# Patient Record
Sex: FEMALE | Race: WHITE | NOT HISPANIC OR LATINO | URBAN - METROPOLITAN AREA
[De-identification: names, ages, dates, MRNs, and addresses within clinical notes are randomized per-mention and may not be internally consistent; named-entity substitution may affect disease eponyms.]

---

## 2017-06-19 ENCOUNTER — ALLSCRIPTS OFFICE VISIT (OUTPATIENT)
Dept: OTHER | Facility: OTHER | Age: 22
End: 2017-06-19

## 2017-06-19 DIAGNOSIS — E78.5 HYPERLIPIDEMIA: ICD-10-CM

## 2017-11-13 ENCOUNTER — LAB REQUISITION (OUTPATIENT)
Dept: LAB | Facility: HOSPITAL | Age: 22
End: 2017-11-13
Payer: COMMERCIAL

## 2017-11-13 ENCOUNTER — ALLSCRIPTS OFFICE VISIT (OUTPATIENT)
Dept: OTHER | Facility: OTHER | Age: 22
End: 2017-11-13

## 2017-11-13 DIAGNOSIS — Z11.3 ENCOUNTER FOR SCREENING FOR INFECTIONS WITH PREDOMINANTLY SEXUAL MODE OF TRANSMISSION: ICD-10-CM

## 2017-11-13 DIAGNOSIS — Z01.419 ENCOUNTER FOR GYNECOLOGICAL EXAMINATION WITHOUT ABNORMAL FINDING: ICD-10-CM

## 2017-11-13 PROCEDURE — 87591 N.GONORRHOEAE DNA AMP PROB: CPT | Performed by: PHYSICIAN ASSISTANT

## 2017-11-13 PROCEDURE — G0145 SCR C/V CYTO,THINLAYER,RESCR: HCPCS | Performed by: PHYSICIAN ASSISTANT

## 2017-11-13 PROCEDURE — 87491 CHLMYD TRACH DNA AMP PROBE: CPT | Performed by: PHYSICIAN ASSISTANT

## 2017-11-14 NOTE — PROGRESS NOTES
Assessment    1  Encounter for gynecological examination without abnormal finding (V72 31) (Z01 [id number])    Plan  Contraceptives    · Sprintec 28 0 25-35 MG-MCG Oral Tablet; TAKE ONE TABLET BY MOUTH ONCEDAILY   Rx By: Adelaida Zee; Dispense: 90 Days ; #:90 X 28 Tablet Disp Pack; Refill: 3;Contraceptives; ESTEPHANIA = Y; Verified Transmission to [address]; Last Updated By: System, [place]; 11/13/2017 9:46:49 AM  Encounter for gynecological examination without abnormal finding, Screening for STD(sexually transmitted disease)    · (1) THIN PREP PAP WITH IMAGING; Status: In Progress - Specimen/Data Collected;  Done: [id number]   Perform:[facility] In Office Collection; HNQ:[id number]; Ordered;for gynecological examination without abnormal finding, Screening for STD (sexually transmitted disease); Ordered By:Marry Wyatt;  Maturation index required? : No  : 11/10/17  HPV? : if ASCUS  Screening for STD (sexually transmitted disease)    · (1) CHLAMYDIA/GC AMPLIFIED DNA, PCR; Source:Cervix; Status: In Progress -Specimen/Data Collected;   Done: [id number]   Perform:[facility] In Office Collection; NFP:[id number]; Ordered;for STD (sexually transmitted disease); Ordered By:Marry Wyatt;    Discussion/Summary  healthy adult female Pap test was done today cervical cancer screening is needed every three years Testing was done today for chlamydia, gonorrhea and HIV  Annual exam and pap performed, will call if abnormalinterested in switching from OCP to [facility], brochures and insurance form givenform to office when decidesrx sent via EMR1 year or sooner for IUD insertion  Chief Complaint  Pt here for yearly doing well today  History of Present Illness  HPI: 19 y/o [de-identified] female here for a new patient annual GYN exam  No significant medical or surgical history  Family hx:PGGM had breast cancer dx age [de-identified]   Menses regular on OCP, every 25 days x 4 days, periods regular off OCP but painful  (+) sexually active with 1 partner, (+) condoms  Pt denies any h/o STDs or dysplasia  Last pap per pt 2016 WNL  complaints    GYN HM, Adult Female [facility]: The patient is being seen for a Annual Gyn Exam evaluation  The last health maintenance visit was 1 year(s) ago  General Health: The patient's health since the last visit is described as good  Lifestyle:  She does not have any weight concerns  -- She exercises regularly  She exercises 3 or more times per week  Exercise includes running/jogging -- She does not use tobacco  The patient has never smoked cigarettes  -- She consumes alcohol  She reports occasional alcohol use  -- She denies drug use  She has never used illicit drugs  Reproductive health: the patient is premenopausal--   she reports no menstrual problems  Menstrual history:  age at menarche was 14  LMP: the last menstrual period was 11/10/17  Recent menstrual periods: bleeding has been normal  The cycles have been regular  The duration of her recent periods has been regular  -- she uses contraception  For contraception, she uses oral contraception pills  -- she is sexually active  She is monogamous with a male partner  -- she denies prior pregnancies G 0  Screening: Cervical cancer screening includes a pap smear performed 1/10/16 Neg,-- no previous human papilloma virus screening-- and-- no previous colposcopy  Breast cancer screening includes no previous mammogram  Colorectal cancer screening includes no previous colonoscopy  Review of Systems   Constitutional: no fever-- and-- no chills  Cardiovascular: no chest pain  Respiratory: no shortness of breath  Breasts: no breast pain-- and-- no breast lump  Gastrointestinal: no abdominal pain  Genitourinary: no dysuria,-- no pelvic pain,-- no vaginal discharge,-- no dysmenorrhea-- and-- no unexplained vaginal bleeding  Neurological: no headache  Active Problems  1  Chronic scapular pain (733 90,338 29) (M89 8X1,G89 29)   2  Contraceptives (V25 02)   3  Polycystic ovarian syndrome (256 4) (E28 2)   4  Right-sided low back pain without sciatica (724 2) (M54 5)   5  Seasonal allergic rhinitis due to pollen (477 0) (J30 1)    Past Medical History   · History of acute pharyngitis (V12 69) (Z87 09)   · History of acute sinusitis (V12 69) (Z87 09)   · History of headache (V13 89) (J14 323)   · History of indigestion (V12 79) (Z87 19)    The active problems and past medical history were reviewed and updated today  Surgical History   · Contraceptives (V25 02)    The surgical history was reviewed and updated today  Family History  Mother    · Family history of polycystic ovarian syndrome (V18 7) (Z84 2)   · Family history of Polycystic Ovarian Syndrome   · Family history of Thyroid Cancer  Maternal Grandmother    · Family history of polycystic ovarian syndrome (V18 7) (Z84 2)  Maternal Grandfather    · Family history of cardiac disorder (V17 49) (Z82 49)   · Family history of hypertension (V17 49) (Z82 49)  Paternal Grandfather    · Family history of diabetes mellitus (V18 0) (Z83 3)  Family History    · Family history of Thyroid Cancer    The family history was reviewed and updated today  Social History   · Never A Smoker   · No illicit drug use   · Occasional alcohol use   · Sexually active  The social history was reviewed and updated today  Current Meds   1  Sprintec 28 0 25-35 MG-MCG Oral Tablet; Take 1 tablet daily as directed Recorded    Allergies  1  No Known Drug Allergies    Vitals   Recorded: [id number] 35:15GQ   Systolic 423, RUE, Sitting   Diastolic 82, RUE, Sitting   Height 5 ft 4 in   Weight 144 lb    BMI Calculated 24 72   BSA Calculated 1 7   LMP [id number]       Physical Exam   Constitutional  General appearance: No acute distress, well appearing and well nourished  Neck  Thyroid: Normal, no thyromegaly  Pulmonary  Respiratory effort: No increased work of breathing or signs of respiratory distress  Auscultation of lungs: Clear to auscultation  Cardiovascular  Auscultation of heart: Normal rate and rhythm, normal S1 and S2, no murmurs  Genitourinary  External genitalia: Normal and no lesions appreciated  Vagina: Normal, no lesions or dryness appreciated  Urethra: Normal    Urethral meatus: Normal    Bladder: Normal, soft, non-tender and no prolapse or masses appreciated  Cervix: Normal, no palpable masses  A Pap smear was performed  Uterus: Normal, non-tender, not enlarged, and no palpable masses  Adnexa/parametria: Normal, non-tender and no fullness or masses appreciated  Chest  Breasts: Normal and no dimpling or skin changes noted  Abdomen  Abdomen: Normal, non-tender, and no organomegaly noted  Psychiatric  Orientation to person, place, and time: Normal    Mood and affect: Normal        Future Appointments    Date/Time Provider Specialty Site   06/21/2018 10:15 AM RAVEN Chisholm   Internal Medicine [facility] INTERNAL MED       Signatures   Electronically signed by : Vikas Ron, [facility]; Nov 13 2017  9:41AM EST                       (Author)    Electronically signed by : RAVEN Esposito ; Nov 13 2017  8:37PM EST                       (Author)

## 2017-11-15 LAB
CHLAMYDIA DNA CVX QL NAA+PROBE: NORMAL
N GONORRHOEA DNA GENITAL QL NAA+PROBE: NORMAL

## 2017-11-21 LAB
LAB AP GYN PRIMARY INTERPRETATION: NORMAL
LAB AP LMP: NORMAL
Lab: NORMAL

## 2018-01-14 VITALS
WEIGHT: 144 LBS | BODY MASS INDEX: 24.59 KG/M2 | DIASTOLIC BLOOD PRESSURE: 72 MMHG | RESPIRATION RATE: 18 BRPM | HEIGHT: 64 IN | HEART RATE: 86 BPM | SYSTOLIC BLOOD PRESSURE: 114 MMHG | TEMPERATURE: 98 F | OXYGEN SATURATION: 99 %

## 2018-01-14 VITALS
SYSTOLIC BLOOD PRESSURE: 128 MMHG | BODY MASS INDEX: 24.59 KG/M2 | HEIGHT: 64 IN | WEIGHT: 144 LBS | DIASTOLIC BLOOD PRESSURE: 82 MMHG

## 2018-06-21 ENCOUNTER — OFFICE VISIT (OUTPATIENT)
Dept: INTERNAL MEDICINE CLINIC | Facility: CLINIC | Age: 23
End: 2018-06-21
Payer: COMMERCIAL

## 2018-06-21 VITALS
OXYGEN SATURATION: 99 % | HEIGHT: 64 IN | HEART RATE: 84 BPM | TEMPERATURE: 99.1 F | RESPIRATION RATE: 14 BRPM | DIASTOLIC BLOOD PRESSURE: 70 MMHG | SYSTOLIC BLOOD PRESSURE: 118 MMHG | BODY MASS INDEX: 25.92 KG/M2 | WEIGHT: 151.8 LBS

## 2018-06-21 DIAGNOSIS — T78.40XA ALLERGIC STATE, INITIAL ENCOUNTER: ICD-10-CM

## 2018-06-21 DIAGNOSIS — E28.2 POLYCYSTIC OVARIAN SYNDROME: ICD-10-CM

## 2018-06-21 DIAGNOSIS — Z00.00 ROUTINE GENERAL MEDICAL EXAMINATION AT A HEALTH CARE FACILITY: Primary | ICD-10-CM

## 2018-06-21 PROCEDURE — 99395 PREV VISIT EST AGE 18-39: CPT | Performed by: INTERNAL MEDICINE

## 2018-06-21 RX ORDER — NORGESTIMATE AND ETHINYL ESTRADIOL 0.25-0.035
1 KIT ORAL DAILY
COMMUNITY

## 2018-06-21 NOTE — ASSESSMENT & PLAN NOTE
This 65-year-old female patient appears to be in good general health  Her examination today is normal and stable  She continues on birth control pills for treatment of polycystic ovarian syndrome  She has a request for a comprehensive metabolic profile as well as lipid panel  Will review the results of the studies once they are completed  Recommend follow-up evaluation in 1 year

## 2018-06-21 NOTE — ASSESSMENT & PLAN NOTE
Seasonal allergy symptoms recommend the use of over-the-counter Claritin Allegra or Zyrtec as per the product instructions

## 2018-06-21 NOTE — PROGRESS NOTES
Assessment/Plan:    Routine general medical examination at a health care facility  This 23-year-old female patient appears to be in good general health  Her examination today is normal and stable  She continues on birth control pills for treatment of polycystic ovarian syndrome  She has a request for a comprehensive metabolic profile as well as lipid panel  Will review the results of the studies once they are completed  Recommend follow-up evaluation in 1 year  Allergy  Seasonal allergy symptoms recommend the use of over-the-counter Claritin Allegra or Zyrtec as per the product instructions  Polycystic ovarian syndrome  Polycystic ovarian syndrome continue on birth control pills       Diagnoses and all orders for this visit:    Routine general medical examination at a health care facility  -     Comprehensive metabolic panel; Future  -     Lipid panel; Future    Allergic state, initial encounter    Polycystic ovarian syndrome    Other orders  -     norgestimate-ethinyl estradiol (SPRINTEC 28) 0 25-35 MG-MCG per tablet; Take 1 tablet by mouth daily        Subjective:      Patient ID: Amrit Ibrahim is a 24 y o  female  This is an annual health maintenance examination for this 23-year-old female patient  She has finished college in will be moving to [place] in the near future to start a new job  She has no physical complaints at this time other than seasonal allergy symptoms with nasal congestion and a recent fall with a persistent tenderness over the coccyx region  The symptoms of coccyx tenderness shot are improving    She does have a history of polycystic ovarian condition and is on birth control pill  The following portions of the patient's history were reviewed and updated as appropriate: She  has no past medical history on file  She  has no past surgical history on file    Her family history includes Heart disease in her maternal grandfather and paternal grandfather; Hypertension in her maternal grandfather; Polycystic ovary syndrome in her maternal grandmother and mother; Thyroid cancer in her family and mother  She  reports that she has never smoked  She does not have any smokeless tobacco history on file  She reports that she drinks alcohol  She reports that she does not use drugs       Review of Systems   HENT: Positive for congestion and rhinorrhea  All other systems reviewed and are negative  Objective:      /70   Pulse 84   Temp 99 1 °F (37 3 °C)   Resp 14   Ht 5' 4" (1 626 m)   Wt 68 9 kg (151 lb 12 8 oz)   SpO2 99%   BMI 26 06 kg/m²          Physical Exam   Constitutional: She is oriented to person, place, and time  Vital signs are normal  She appears well-developed and well-nourished  She is cooperative  No distress  HENT:   Head: Normocephalic and atraumatic  Right Ear: Hearing, tympanic membrane, external ear and ear canal normal    Left Ear: Hearing, tympanic membrane, external ear and ear canal normal    Nose: Nose normal  No mucosal edema  Mouth/Throat: Uvula is midline, oropharynx is clear and moist and mucous membranes are normal    Eyes: Conjunctivae and lids are normal  Pupils are equal, round, and reactive to light  Right eye exhibits no discharge  Left eye exhibits no discharge  Neck: No JVD present  Carotid bruit is not present  No thyromegaly present  Cardiovascular: Normal rate, regular rhythm, normal heart sounds and intact distal pulses  No murmur heard  Pulmonary/Chest: Effort normal and breath sounds normal  No respiratory distress  She has no wheezes  She has no rales  She exhibits no tenderness  Abdominal: Soft  Normal appearance and bowel sounds are normal  She exhibits no distension and no mass  There is no tenderness  There is no rebound and no guarding  Musculoskeletal: Normal range of motion  She exhibits no edema, tenderness or deformity  Lymphadenopathy:     She has no cervical adenopathy     Neurological: She is alert and oriented to person, place, and time  She has normal reflexes  She displays normal reflexes  No cranial nerve deficit  She exhibits normal muscle tone  Coordination normal    Skin: Skin is warm, dry and intact  No rash noted  She is not diaphoretic  No erythema  No pallor  Psychiatric: She has a normal mood and affect  Her speech is normal and behavior is normal  Judgment and thought content normal  Cognition and memory are normal    Vitals reviewed

## 2018-06-22 ENCOUNTER — TRANSCRIBE ORDERS (OUTPATIENT)
Dept: ADMINISTRATIVE | Age: 23
End: 2018-06-22

## 2018-06-22 ENCOUNTER — APPOINTMENT (OUTPATIENT)
Dept: LAB | Age: 23
End: 2018-06-22
Payer: COMMERCIAL

## 2018-06-22 DIAGNOSIS — Z00.00 ROUTINE GENERAL MEDICAL EXAMINATION AT A HEALTH CARE FACILITY: ICD-10-CM

## 2018-06-22 LAB
ALBUMIN SERPL BCP-MCNC: 3.7 G/DL (ref 3.5–5)
ALP SERPL-CCNC: 33 U/L (ref 46–116)
ALT SERPL W P-5'-P-CCNC: 19 U/L (ref 12–78)
ANION GAP SERPL CALCULATED.3IONS-SCNC: 7 MMOL/L (ref 4–13)
AST SERPL W P-5'-P-CCNC: 15 U/L (ref 5–45)
BILIRUB SERPL-MCNC: 0.36 MG/DL (ref 0.2–1)
BUN SERPL-MCNC: 12 MG/DL (ref 5–25)
CALCIUM SERPL-MCNC: 9.1 MG/DL (ref 8.3–10.1)
CHLORIDE SERPL-SCNC: 106 MMOL/L (ref 100–108)
CHOLEST SERPL-MCNC: 175 MG/DL (ref 50–200)
CO2 SERPL-SCNC: 27 MMOL/L (ref 21–32)
CREAT SERPL-MCNC: 0.75 MG/DL (ref 0.6–1.3)
GFR SERPL CREATININE-BSD FRML MDRD: 112 ML/MIN/1.73SQ M
GLUCOSE P FAST SERPL-MCNC: 85 MG/DL (ref 65–99)
HDLC SERPL-MCNC: 63 MG/DL (ref 40–60)
LDLC SERPL CALC-MCNC: 96 MG/DL (ref 0–100)
NONHDLC SERPL-MCNC: 112 MG/DL
POTASSIUM SERPL-SCNC: 4.1 MMOL/L (ref 3.5–5.3)
PROT SERPL-MCNC: 7.5 G/DL (ref 6.4–8.2)
SODIUM SERPL-SCNC: 140 MMOL/L (ref 136–145)
TRIGL SERPL-MCNC: 79 MG/DL

## 2018-06-22 PROCEDURE — 36415 COLL VENOUS BLD VENIPUNCTURE: CPT

## 2018-06-22 PROCEDURE — 80053 COMPREHEN METABOLIC PANEL: CPT

## 2018-06-22 PROCEDURE — 80061 LIPID PANEL: CPT

## 2021-01-27 ENCOUNTER — IMMUNIZATIONS (OUTPATIENT)
Dept: FAMILY MEDICINE CLINIC | Facility: HOSPITAL | Age: 26
End: 2021-01-27

## 2021-01-27 DIAGNOSIS — Z23 ENCOUNTER FOR IMMUNIZATION: Primary | ICD-10-CM

## 2021-01-27 PROCEDURE — 91301 SARS-COV-2 / COVID-19 MRNA VACCINE (MODERNA) 100 MCG: CPT

## 2021-01-27 PROCEDURE — 0011A SARS-COV-2 / COVID-19 MRNA VACCINE (MODERNA) 100 MCG: CPT

## 2021-02-22 ENCOUNTER — IMMUNIZATIONS (OUTPATIENT)
Dept: FAMILY MEDICINE CLINIC | Facility: HOSPITAL | Age: 26
End: 2021-02-22

## 2021-02-22 DIAGNOSIS — Z23 ENCOUNTER FOR IMMUNIZATION: Primary | ICD-10-CM

## 2021-02-22 PROCEDURE — 91301 SARS-COV-2 / COVID-19 MRNA VACCINE (MODERNA) 100 MCG: CPT

## 2021-02-22 PROCEDURE — 0012A SARS-COV-2 / COVID-19 MRNA VACCINE (MODERNA) 100 MCG: CPT
